# Patient Record
Sex: FEMALE | Race: WHITE | NOT HISPANIC OR LATINO | Employment: UNEMPLOYED | URBAN - METROPOLITAN AREA
[De-identification: names, ages, dates, MRNs, and addresses within clinical notes are randomized per-mention and may not be internally consistent; named-entity substitution may affect disease eponyms.]

---

## 2020-03-12 LAB — CRC RECOMMENDATION EXT: NORMAL

## 2022-11-28 ENCOUNTER — PATIENT OUTREACH (OUTPATIENT)
Dept: ADMINISTRATIVE | Facility: HOSPITAL | Age: 62
End: 2022-11-28

## 2022-11-28 PROBLEM — I73.9 PERIPHERAL VASCULAR DISEASE: Status: ACTIVE | Noted: 2019-07-19

## 2022-11-28 PROBLEM — H52.03 HYPEROPIA OF BOTH EYES: Status: ACTIVE | Noted: 2020-06-16

## 2022-11-28 PROBLEM — H04.123 DRY EYE SYNDROME OF BILATERAL LACRIMAL GLANDS: Status: ACTIVE | Noted: 2020-06-16

## 2022-11-28 PROBLEM — F17.200 SMOKING: Status: ACTIVE | Noted: 2019-08-28

## 2022-11-28 PROBLEM — M47.818 SI JOINT ARTHRITIS: Status: ACTIVE | Noted: 2019-09-04

## 2022-11-28 PROBLEM — D23.10 PAPILLOMA OF LEFT EYELID: Status: ACTIVE | Noted: 2020-06-24

## 2022-11-28 PROBLEM — G89.4 CHRONIC PAIN SYNDROME: Status: ACTIVE | Noted: 2018-11-07

## 2022-11-28 PROBLEM — N39.3 FEMALE STRESS INCONTINENCE: Status: ACTIVE | Noted: 2020-03-18

## 2022-11-28 PROBLEM — E11.9 TYPE 2 DIABETES MELLITUS: Status: ACTIVE | Noted: 2017-08-14

## 2022-11-28 PROBLEM — G25.81 RESTLESS LEGS SYNDROME (RLS): Status: ACTIVE | Noted: 2019-09-04

## 2022-11-28 PROBLEM — E78.5 HYPERLIPIDEMIA: Status: ACTIVE | Noted: 2018-05-01

## 2022-11-28 PROBLEM — E04.2 MULTINODULAR GOITER: Status: ACTIVE | Noted: 2022-07-06

## 2022-11-28 PROBLEM — I10 ESSENTIAL HYPERTENSION: Status: ACTIVE | Noted: 2017-08-14

## 2022-11-28 PROBLEM — M51.16 LUMBAR DISC DISEASE WITH RADICULOPATHY: Status: ACTIVE | Noted: 2019-09-04

## 2022-11-28 NOTE — PROGRESS NOTES
Population Health Outreach.Records Received, hyper-linked into chart at this time. The following record(s)  below were uploaded for Health Maintenance .    3/12/2020-colonoscopy

## 2023-01-18 PROBLEM — M72.2 PLANTAR FASCIITIS OF RIGHT FOOT: Status: ACTIVE | Noted: 2023-01-18

## 2023-03-08 PROBLEM — M47.816 LUMBAR SPONDYLOSIS: Status: ACTIVE | Noted: 2023-03-08

## 2023-05-24 PROBLEM — N28.89 RENAL MASS: Status: ACTIVE | Noted: 2023-05-24

## 2023-05-24 PROBLEM — N28.89 RENAL MASS: Status: RESOLVED | Noted: 2023-05-24 | Resolved: 2023-05-24

## 2023-05-24 PROBLEM — N28.89 RENAL MASS, LEFT: Status: ACTIVE | Noted: 2023-05-24

## 2023-10-12 PROBLEM — G89.29 CHRONIC PAIN: Status: ACTIVE | Noted: 2018-11-06
